# Patient Record
Sex: MALE | Race: WHITE | Employment: UNEMPLOYED | URBAN - METROPOLITAN AREA
[De-identification: names, ages, dates, MRNs, and addresses within clinical notes are randomized per-mention and may not be internally consistent; named-entity substitution may affect disease eponyms.]

---

## 2019-07-08 ENCOUNTER — TELEPHONE (OUTPATIENT)
Dept: SURGERY | Facility: CLINIC | Age: 54
End: 2019-07-08

## 2019-07-08 NOTE — TELEPHONE ENCOUNTER
Patient is 6.5 years s/p DS with Dr. Jewell.  Patient's last follow-up in Bariatric clinic was over 5 years ago.  Spoke to patient over the phone regarding post-op status since last follow-up in clinic.  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  Patient notes that he has recently gotten back on track with his diet after falling off for a short period of time.  Patient notes that he has started to focus more so on protein as the main entree throughout the day.  Patient also notes that he continues to take his vitamin/mineral supplements post DS such as MVI, Calcium, ADEK, and Vitamin B12.  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  Provided patient with contact information to schedule an appointment after the first of the year.